# Patient Record
Sex: FEMALE | Race: WHITE | NOT HISPANIC OR LATINO | Employment: OTHER | URBAN - METROPOLITAN AREA
[De-identification: names, ages, dates, MRNs, and addresses within clinical notes are randomized per-mention and may not be internally consistent; named-entity substitution may affect disease eponyms.]

---

## 2019-07-18 ENCOUNTER — THERAPY VISIT (OUTPATIENT)
Dept: PHYSICAL THERAPY | Facility: CLINIC | Age: 64
End: 2019-07-18
Payer: COMMERCIAL

## 2019-07-18 DIAGNOSIS — M25.512 SHOULDER PAIN, LEFT: ICD-10-CM

## 2019-07-18 PROCEDURE — 97112 NEUROMUSCULAR REEDUCATION: CPT | Mod: GP | Performed by: PHYSICAL THERAPIST

## 2019-07-18 PROCEDURE — 97161 PT EVAL LOW COMPLEX 20 MIN: CPT | Mod: GP | Performed by: PHYSICAL THERAPIST

## 2019-07-18 NOTE — PROGRESS NOTES
"[facility] Initial Evaluation  Subjective:  The history is provided by the patient.   Ethel Orozco being seen for Left shoulder pain.   Date of Onset: several months ago. Problem occurred: \"gradual pain in shoulder that got worse, with movement and sleeping\"  and reported as 3/10 on pain scale. General health as reported by patient is excellent. Pertinent medical history includes:  Cancer, concussions/dizziness, implanted device, numbness/tingling, osteoarthritis, osteoporosis, overweight and sleep disorder/apnea.  Medical allergies: latex.  Surgeries include:  Cancer surgery. Other surgery history details: overian cancer surgery history.  Current medications:  Bone density.   Primary job tasks include:  Computer work, lifting/carrying and prolonged sitting.  Pain is described as aching and is constant.  Since onset symptoms are gradually worsening.      Patient is Billing/accounts payable - part time. Restrictions include:  Working in normal job without restrictions.    Barriers include:  None as reported by patient.  Red flags:  Pain at rest/night and cold/hot extremity (pt cleared from these red flags being significant in role of shoulder pathology).  Type of problem:  Left shoulder   Condition occurred with:  Unknown cause. This is a chronic condition   Problem details: Patient has had pain for a couple of months now. She has been screened out for her cardiac PVC's, and this is not the cause of her shoulder pain. .   Patient reports pain:  Scapular area, anterior and lateral. Radiates to:  Upper arm. Associated symptoms:  Dislocating/subluxing, loss of motion/stiffness and loss of strength. Symptoms are exacerbated by certain positions, lying on extremity and using arm behind back (lying on back sometimes is more painful) and relieved by NSAID's.                      Objective:  System                   Shoulder Evaluation:  ROM:  AROM:    Flexion:  Left:  WNL    Right:  WNL    Abduction:  " Left: WNL   Right:  WNL    Internal Rotation:  Left:  T7    Right:  T4  External Rotation:  Left:  C5    Right:  T1                  Pain: past 90 deg in flx, abd    Strength:    Flexion: Left:4/5    Pain: ++    Right: 5-/5     Pain:     Abduction:  Left: 4/5   Pain:++    Right: 5-/5     Pain:    Internal Rotation:  Left:4+/5      Pain:+    Right: 5/5     Pain:  External Rotation:   Left:3+/5      Pain:+++   Right:5-/5     Pain:        Elbow Flexion:  Left:5/5     Pain:    Right:5/5     Pain:      Special Tests:    Left shoulder positive for the following special tests:  Impingement                                         General     ROS    Assessment/Plan:    Patient is a 64 year old female with left side shoulder complaints.    Patient has the following significant findings with corresponding treatment plan.                Diagnosis 1:  L shoulder pain, consistent with impingement and some RC pain  Pain -  manual therapy, self management and home program  Decreased ROM/flexibility - manual therapy, therapeutic exercise, therapeutic activity and home program  Decreased strength - therapeutic exercise, therapeutic activities and home program  Impaired muscle performance - neuro re-education and home program  Decreased function - therapeutic activities and home program  Impaired posture - neuro re-education and therapeutic activities    Therapy Evaluation Codes:     Cumulative Therapy Evaluation is: Low complexity.    Previous and current functional limitations:  (See Goal Flow Sheet for this information)    Short term and Long term goals: (See Goal Flow Sheet for this information)     Communication ability:  Patient appears to be able to clearly communicate and understand verbal and written communication and follow directions correctly.  Treatment Explanation - The following has been discussed with the patient:   RX ordered/plan of care  Anticipated outcomes  Possible risks and side effects  This patient would  benefit from PT intervention to resume normal activities.   Rehab potential is good.    Frequency:  1 X week, once daily  Duration:  for 6 weeks  Discharge Plan:  Achieve all LTG.  Independent in home treatment program.  Reach maximal therapeutic benefit.    Please refer to the daily flowsheet for treatment today, total treatment time and time spent performing 1:1 timed codes.

## 2019-07-25 ENCOUNTER — THERAPY VISIT (OUTPATIENT)
Dept: PHYSICAL THERAPY | Facility: CLINIC | Age: 64
End: 2019-07-25
Payer: COMMERCIAL

## 2019-07-25 DIAGNOSIS — M25.512 SHOULDER PAIN, LEFT: ICD-10-CM

## 2019-07-25 PROCEDURE — 97112 NEUROMUSCULAR REEDUCATION: CPT | Mod: GP | Performed by: PHYSICAL THERAPIST

## 2019-07-25 PROCEDURE — 97110 THERAPEUTIC EXERCISES: CPT | Mod: GP | Performed by: PHYSICAL THERAPIST

## 2019-08-01 ENCOUNTER — THERAPY VISIT (OUTPATIENT)
Dept: PHYSICAL THERAPY | Facility: CLINIC | Age: 64
End: 2019-08-01
Payer: COMMERCIAL

## 2019-08-01 DIAGNOSIS — M25.512 SHOULDER PAIN, LEFT: ICD-10-CM

## 2019-08-01 PROCEDURE — 97110 THERAPEUTIC EXERCISES: CPT | Mod: GP | Performed by: PHYSICAL THERAPIST

## 2019-08-01 PROCEDURE — 97140 MANUAL THERAPY 1/> REGIONS: CPT | Mod: GP | Performed by: PHYSICAL THERAPIST

## 2019-08-01 PROCEDURE — 97112 NEUROMUSCULAR REEDUCATION: CPT | Mod: GP | Performed by: PHYSICAL THERAPIST

## 2019-08-15 ENCOUNTER — THERAPY VISIT (OUTPATIENT)
Dept: PHYSICAL THERAPY | Facility: CLINIC | Age: 64
End: 2019-08-15
Payer: COMMERCIAL

## 2019-08-15 DIAGNOSIS — M25.512 ACUTE PAIN OF LEFT SHOULDER: ICD-10-CM

## 2019-08-15 PROCEDURE — 97140 MANUAL THERAPY 1/> REGIONS: CPT | Mod: GP | Performed by: PHYSICAL THERAPY ASSISTANT

## 2019-08-15 PROCEDURE — 97110 THERAPEUTIC EXERCISES: CPT | Mod: GP | Performed by: PHYSICAL THERAPY ASSISTANT

## 2019-08-15 PROCEDURE — 97112 NEUROMUSCULAR REEDUCATION: CPT | Mod: GP | Performed by: PHYSICAL THERAPY ASSISTANT

## 2019-08-22 ENCOUNTER — THERAPY VISIT (OUTPATIENT)
Dept: PHYSICAL THERAPY | Facility: CLINIC | Age: 64
End: 2019-08-22
Payer: COMMERCIAL

## 2019-08-22 DIAGNOSIS — M25.512 ACUTE PAIN OF LEFT SHOULDER: ICD-10-CM

## 2019-08-22 PROCEDURE — 97112 NEUROMUSCULAR REEDUCATION: CPT | Mod: GP | Performed by: PHYSICAL THERAPIST

## 2019-08-22 PROCEDURE — 97110 THERAPEUTIC EXERCISES: CPT | Mod: GP | Performed by: PHYSICAL THERAPIST

## 2019-08-22 PROCEDURE — 97140 MANUAL THERAPY 1/> REGIONS: CPT | Mod: GP | Performed by: PHYSICAL THERAPIST

## 2019-08-29 ENCOUNTER — THERAPY VISIT (OUTPATIENT)
Dept: PHYSICAL THERAPY | Facility: CLINIC | Age: 64
End: 2019-08-29
Payer: COMMERCIAL

## 2019-08-29 DIAGNOSIS — M25.512 ACUTE PAIN OF LEFT SHOULDER: ICD-10-CM

## 2019-08-29 PROCEDURE — 97110 THERAPEUTIC EXERCISES: CPT | Mod: GP | Performed by: PHYSICAL THERAPIST

## 2019-08-29 PROCEDURE — 97112 NEUROMUSCULAR REEDUCATION: CPT | Mod: GP | Performed by: PHYSICAL THERAPIST

## 2019-08-29 NOTE — PROGRESS NOTES
PROGRESS REPORT    Progress reporting period is from 7/18/19 to 8/29/19.       SUBJECTIVE     Ethel was very sore after last session, but had improved ROM and pain report after. She is able to complete dressing without pain, but uses other arm to assist. Feels that this is about the worst activity for her, and she is about 90% maximal function otherwise. She has a fair amount of questions about her HEP.     Current Pain level: 1/10.     Initial Pain level: 3/10.   Changes in function:  Yes (See Goal flowsheet attached for changes in current functional level)  Adverse reaction to treatment or activity: None    OBJECTIVE  Changes noted in objective findings:  Yes,   Objective: requires cueing for about 75% of HEP exercises;  Progression to GTB for rowing and pull downs today; improved ROM to near WNL without pain, strength deficits noted through fatigue with exercises today    ASSESSMENT/PLAN  Updated problem list and treatment plan:  Diagnosis 1:  L shoulder pain, consistent with impingement and some RC pain  Pain -  manual therapy, self management and home program  Decreased ROM/flexibility - manual therapy, therapeutic exercise, therapeutic activity and home program  Decreased strength - therapeutic exercise, therapeutic activities and home program  Impaired muscle performance - neuro re-education and home program  Decreased function - therapeutic activities and home program  Impaired posture - neuro re-education and therapeutic activities     STG/LTGs have been met or progress has been made towards goals:  Yes (See Goal flow sheet completed today.)  Assessment of Progress: The patient's condition is improving.  Self Management Plans:  Patient has been instructed in a home treatment program.  I have re-evaluated this patient and find that the nature, scope, duration and intensity of the therapy is appropriate for the medical condition of the patient.  Ethel continues to require the following intervention to meet  STG and LTG's:  PT    Recommendations:  This patient would benefit from continued therapy.     Frequency:  1 X week, once daily  Duration:  for 2 weeks        Please refer to the daily flowsheet for treatment today, total treatment time and time spent performing 1:1 timed codes.

## 2019-09-05 ENCOUNTER — THERAPY VISIT (OUTPATIENT)
Dept: PHYSICAL THERAPY | Facility: CLINIC | Age: 64
End: 2019-09-05
Payer: COMMERCIAL

## 2019-09-05 DIAGNOSIS — M25.512 ACUTE PAIN OF LEFT SHOULDER: ICD-10-CM

## 2019-09-05 PROCEDURE — 97110 THERAPEUTIC EXERCISES: CPT | Mod: GP | Performed by: PHYSICAL THERAPIST

## 2019-09-05 PROCEDURE — 97112 NEUROMUSCULAR REEDUCATION: CPT | Mod: GP | Performed by: PHYSICAL THERAPIST

## 2019-09-11 ENCOUNTER — THERAPY VISIT (OUTPATIENT)
Dept: PHYSICAL THERAPY | Facility: CLINIC | Age: 64
End: 2019-09-11
Payer: COMMERCIAL

## 2019-09-11 DIAGNOSIS — M25.512 ACUTE PAIN OF LEFT SHOULDER: ICD-10-CM

## 2019-09-11 PROCEDURE — 97112 NEUROMUSCULAR REEDUCATION: CPT | Mod: GP | Performed by: PHYSICAL THERAPIST

## 2019-09-11 PROCEDURE — 97110 THERAPEUTIC EXERCISES: CPT | Mod: GP | Performed by: PHYSICAL THERAPIST

## 2019-09-11 NOTE — PROGRESS NOTES
DISCHARGE REPORT    Progress reporting period is from 8/29/19 to 9/11/19.       SUBJECTIVE  Subjective changes noted by patient: improved independence through HEP, less pain  Subjective: Ethel is pretty tight on the L shoulder and neck area, likely from doing too much around the house and the HEP.    Current Pain level: 1/10.      Initial Pain level: 3/10.   Changes in function:  Yes (See Goal flowsheet attached for changes in current functional level)  Adverse reaction to treatment or activity: None    OBJECTIVE  Changes noted in objective findings:  Yes, see below    Objective: full ROM, strength 5/5 throughout; improved memory and understanding of all HEP activities to point of appropriate discharge and performance     ASSESSMENT/PLAN  Updated problem list and treatment plan: Diagnosis 1:  L shoulder pain  Pain -  home program  Decreased ROM/flexibility - home program  Decreased strength - home program  Impaired posture - home program  STG/LTGs have been met or progress has been made towards goals:  Yes (See Goal flow sheet completed today.)  Assessment of Progress: The patient's condition is improving.  Self Management Plans:  Patient is independent in a home treatment program.  I have re-evaluated this patient and find that the nature, scope, duration and intensity of the therapy is appropriate for the medical condition of the patient.  Ethel continues to require the following intervention to meet STG and LTG's:  PT intervention is no longer required to meet STG/LTG.    Recommendations:  This patient is ready to be discharged from therapy and continue their home treatment program.    Please refer to the daily flowsheet for treatment today, total treatment time and time spent performing 1:1 timed codes.

## 2021-05-24 ENCOUNTER — RECORDS - HEALTHEAST (OUTPATIENT)
Dept: ADMINISTRATIVE | Facility: CLINIC | Age: 66
End: 2021-05-24

## 2021-05-26 ENCOUNTER — RECORDS - HEALTHEAST (OUTPATIENT)
Dept: ADMINISTRATIVE | Facility: CLINIC | Age: 66
End: 2021-05-26

## 2021-05-27 ENCOUNTER — RECORDS - HEALTHEAST (OUTPATIENT)
Dept: ADMINISTRATIVE | Facility: CLINIC | Age: 66
End: 2021-05-27

## 2021-05-29 ENCOUNTER — RECORDS - HEALTHEAST (OUTPATIENT)
Dept: ADMINISTRATIVE | Facility: CLINIC | Age: 66
End: 2021-05-29

## 2021-05-30 ENCOUNTER — RECORDS - HEALTHEAST (OUTPATIENT)
Dept: ADMINISTRATIVE | Facility: CLINIC | Age: 66
End: 2021-05-30

## 2021-08-05 ENCOUNTER — THERAPY VISIT (OUTPATIENT)
Dept: PHYSICAL THERAPY | Facility: CLINIC | Age: 66
End: 2021-08-05
Payer: COMMERCIAL

## 2021-08-05 DIAGNOSIS — M79.671 RIGHT FOOT PAIN: ICD-10-CM

## 2021-08-05 PROCEDURE — 97161 PT EVAL LOW COMPLEX 20 MIN: CPT | Mod: GP | Performed by: PHYSICAL THERAPIST

## 2021-08-05 PROCEDURE — 97110 THERAPEUTIC EXERCISES: CPT | Mod: GP | Performed by: PHYSICAL THERAPIST

## 2021-08-05 NOTE — PROGRESS NOTES
Physical Therapy Initial Evaluation  Subjective:  The history is provided by the patient. No  was used.   Patient Health History  Ethel Orozco being seen for Right Achilles Pain.     Problem began: 11/5/2020.   Problem occurred: Not sure   Pain is reported as 2/10 on pain scale.  General health as reported by patient is excellent.  Health conditions: headaches, cancer, osteoporosis, sleep disorder, cold extremity.     Medical allergies: latex.       Current medications: bone density, headache medications.    Current occupation is very part time [employer].   Primary job tasks include:  Prolonged sitting and prolonged standing.                  Therapist Generated HPI Evaluation         Type of problem:  Right ankle.    This is a recurrent condition.  Condition occurred with:  Insidious onset.  Where condition occurred: for unknown reasons.  Site of Pain: posterior right Heel.  Pain quality: throbbing. and is intermittent.  Pain radiates to:  No radiation. Pain is worse in the P.M..  Since onset symptoms are unchanged.  Symptoms are exacerbated by activity and certain positions  and relieved by rest.  Special tests included:  X-ray (achilles tendinopathy seen on X-ray).    Barriers include:  None as reported by patient.                        Objective:  Standing Alignment:                Ankle/Foot:  Pes planus R and pes planus L              Ankle/Foot Evaluation  ROM:    AROM:    Dorsiflexion:  Left:   12  Right:   10  Plantarflexion:  Left:  70    Right:  70  Inversion:  Left:  45     Right:  40  Eversion:  45     Right:  30      PROM:          Eversion: Left:      Right:  40      Pain: no pain with B ankle AROM.    Strength:    Dorsiflexion:  Left: 5/5   Strong/pain free    Pain:   Right: 5/5   Strong/pain free  Pain:  Plantarflexion: Left: 4/5   Strong/pain free  Pain:   Right: 2/5  Weak/pain free  Pain:  Inversion:Left: 5/5  Pain:     Right: 4-/5  Pain:  Eversion:Left: 5/5  Strong/pain  free  Pain:  Right: 5/5  Strong/pain free  Pain:                          MOBILITY TESTING:     Tib-Fib Distal Left: normal    Tib-Fib Distal Right: normal  Talocrural Left: normal    Talocrural Right: normal  Subtalar Left: normal    Subtalar Right: hypomobile  Midtarsal Left: normal    Midtarsal Right: normal  First Ray Left: normal    First Ray Right: normal                                                    General     ROS    Assessment/Plan:    Patient is a 66 year old female with right side ankle complaints.    Patient has the following significant findings with corresponding treatment plan.                Diagnosis 1:  Right Foot Pain  Pain -  manual therapy, self management, education and home program  Decreased ROM/flexibility - manual therapy and therapeutic exercise  Decreased strength - therapeutic exercise and therapeutic activities  Impaired gait - gait training  Decreased function - therapeutic activities  Impaired posture - neuro re-education    Therapy Evaluation Codes:   1) History comprised of:   Personal factors that impact the plan of care:      None.    Comorbidity factors that impact the plan of care are:      none.     Medications impacting care: None.  2) Examination of Body Systems comprised of:   Body structures and functions that impact the plan of care:      right foot pain.   Activity limitations that impact the plan of care are:      standing, walking, stairs.  3) Clinical presentation characteristics are:   Stable/Uncomplicated.  4) Decision-Making    Low complexity using standardized patient assessment instrument and/or measureable assessment of functional outcome.  Cumulative Therapy Evaluation is: Low complexity.    Previous and current functional limitations:  (See Goal Flow Sheet for this information)    Short term and Long term goals: (See Goal Flow Sheet for this information)     Communication ability:  Patient appears to be able to clearly communicate and understand verbal and  written communication and follow directions correctly.  Treatment Explanation - The following has been discussed with the patient:   RX ordered/plan of care  Anticipated outcomes  Possible risks and side effects  This patient would benefit from PT intervention to resume normal activities.   Rehab potential is excellent.    Frequency:  1 X week, once daily  Duration:  for 8 weeks  Discharge Plan:  Achieve all LTG.  Independent in home treatment program.  Reach maximal therapeutic benefit.    Please refer to the daily flowsheet for treatment today, total treatment time and time spent performing 1:1 timed codes.

## 2021-08-05 NOTE — LETTER
[facility] [place]  [address]  [address]  [place] [address]  [phone]    2021    Re: Ethel Orozco   :   1955  MRN:  [id number]   REFERRING PHYSICIAN:   Cristhian DOTSON [facility] [place]    Date of Initial Evaluation:  2021   Visits:  Rxs Used: 1  Reason for Referral:  Right foot pain    EVALUATION SUMMARY    Physical Therapy Initial Evaluation  Subjective:  The history is provided by the patient. No  was used.   Patient Health History  Ethel Orozco being seen for Right Achilles Pain.   Problem began: 2020.   Problem occurred: Not sure   Pain is reported as 2/10 on pain scale.  General health as reported by patient is excellent.  Health conditions: headaches, cancer, osteoporosis, sleep disorder, cold extremity.   Medical allergies: latex.   Current medications: bone density, headache medications.    Current occupation is very part time [employer].   Primary job tasks include:  Prolonged sitting and prolonged standing.                Therapist Generated HPI Evaluation   Type of problem:  Right ankle.  This is a recurrent condition.  Condition occurred with:  Insidious onset.  Where condition occurred: for unknown reasons.  Site of Pain: posterior right Heel.  Pain quality: throbbing. and is intermittent.  Pain radiates to:  No radiation. Pain is worse in the P.M..  Since onset symptoms are unchanged.  Symptoms are exacerbated by activity and certain positions  and relieved by rest.  Special tests included:  X-ray (achilles tendinopathy seen on X-ray).  Barriers include:  None as reported by patient.                  Objective:  Standing Alignment:    Ankle/Foot:  Pes planus R and pes planus L      Ethel Orozco   :   1955    Ankle/Foot Evaluation  ROM:    AROM:    Dorsiflexion:  Left:   12  Right:   10  Plantarflexion:  Left:  70    Right:  70  Inversion:  Left:  45      Right:  40  Eversion:  45     Right:  30    PROM:    Eversion: Left:      Right:  40  Pain: no pain with B ankle AROM.    Strength:    Dorsiflexion:  Left: 5/5   Strong/pain free    Pain:   Right: 5/5   Strong/pain free  Pain:  Plantarflexion: Left: 4/5   Strong/pain free  Pain:   Right: 2/5  Weak/pain free  Pain:  Inversion:Left: 5/5  Pain:     Right: 4-/5  Pain:  Eversion:Left: 5/5  Strong/pain free  Pain:  Right: 5/5  Strong/pain free  Pain:    MOBILITY TESTING:   Tib-Fib Distal Left: normal    Tib-Fib Distal Right: normal  Talocrural Left: normal    Talocrural Right: normal  Subtalar Left: normal    Subtalar Right: hypomobile  Midtarsal Left: normal    Midtarsal Right: normal  First Ray Left: normal    First Ray Right: normal    Assessment/Plan:    Patient is a 66 year old female with right side ankle complaints.    Patient has the following significant findings with corresponding treatment plan.                Diagnosis 1:  Right Foot Pain  Pain -  manual therapy, self management, education and home program  Decreased ROM/flexibility - manual therapy and therapeutic exercise  Decreased strength - therapeutic exercise and therapeutic activities  Impaired gait - gait training  Decreased function - therapeutic activities  Impaired posture - neuro re-education    Therapy Evaluation Codes:   1) History comprised of:   Personal factors that impact the plan of care:      None.    Comorbidity factors that impact the plan of care are:      none.     Medications impacting care: None.  2) Examination of Body Systems comprised of:   Body structures and functions that impact the plan of care:      right foot pain.   Activity limitations that impact the plan of care are:      standing, walking, stairs.  3) Clinical presentation characteristics are:   Stable/Uncomplicated.  Ethel Orozco   :   1955    4) Decision-Making    Low complexity using standardized patient assessment instrument and/or measureable assessment of  functional outcome.  Cumulative Therapy Evaluation is: Low complexity.  Previous and current functional limitations:  (See Goal Flow Sheet for this information)    Short term and Long term goals: (See Goal Flow Sheet for this information)   Communication ability:  Patient appears to be able to clearly communicate and understand verbal and written communication and follow directions correctly.  Treatment Explanation - The following has been discussed with the patient:   RX ordered/plan of care  Anticipated outcomes  Possible risks and side effects  This patient would benefit from PT intervention to resume normal activities.   Rehab potential is excellent.    Frequency:  1 X week, once daily  Duration:  for 8 weeks  Discharge Plan:  Achieve all LTG.  Independent in home treatment program.  Reach maximal therapeutic benefit.    Thank you for your referral.    INQUIRIES  Therapist: Silvia Awan, PT, DPT  [address] [address]  Phone: [phone]  Fax: [phone]

## 2021-08-16 ENCOUNTER — THERAPY VISIT (OUTPATIENT)
Dept: PHYSICAL THERAPY | Facility: CLINIC | Age: 66
End: 2021-08-16
Payer: COMMERCIAL

## 2021-08-16 DIAGNOSIS — M79.671 RIGHT FOOT PAIN: ICD-10-CM

## 2021-08-16 PROCEDURE — 97110 THERAPEUTIC EXERCISES: CPT | Mod: GP | Performed by: PHYSICAL THERAPIST

## 2021-08-31 ENCOUNTER — THERAPY VISIT (OUTPATIENT)
Dept: PHYSICAL THERAPY | Facility: CLINIC | Age: 66
End: 2021-08-31
Payer: COMMERCIAL

## 2021-08-31 DIAGNOSIS — M79.671 RIGHT FOOT PAIN: ICD-10-CM

## 2021-08-31 PROCEDURE — 97110 THERAPEUTIC EXERCISES: CPT | Mod: GP | Performed by: PHYSICAL THERAPIST

## 2021-09-27 ENCOUNTER — THERAPY VISIT (OUTPATIENT)
Dept: PHYSICAL THERAPY | Facility: CLINIC | Age: 66
End: 2021-09-27
Payer: COMMERCIAL

## 2021-09-27 DIAGNOSIS — M79.671 RIGHT FOOT PAIN: ICD-10-CM

## 2021-09-27 PROCEDURE — 97110 THERAPEUTIC EXERCISES: CPT | Mod: GP | Performed by: PHYSICAL THERAPIST

## 2021-09-27 NOTE — PROGRESS NOTES
Assessment/Plan:    DISCHARGE REPORT    Progress reporting period is from 8/5/2021 to 9/27/2021.       SUBJECTIVE  Subjective changes noted by patient:   Subjective: pt reports minimal pain in the base of the heel.  Overall, 90-95% better since PT. very happy with her progress.     Initial Pain level: 0/10.   Changes in function:  Yes (See Goal flowsheet attached for changes in current functional level)  Adverse reaction to treatment or activity: None    OBJECTIVE  Changes noted in objective findings:  Yes,   Objective: Ankle Strength: full strength all directions B ankles. full ankle AROM with no pain.     ASSESSMENT/PLAN  Updated problem list and treatment plan:   STG/LTGs have been met or progress has been made towards goals:  Yes (See Goal flow sheet completed today.)  Assessment of Progress: The patient has met all of their long term goals.  Self Management Plans:  Patient is independent in a home treatment program.  I have re-evaluated this patient and find that the nature, scope, duration and intensity of the therapy is appropriate for the medical condition of the patient.  Ethel continues to require the following intervention to meet STG and LTG's:  PT intervention is no longer required to meet STG/LTG.    Recommendations:  This patient is ready to be discharged from therapy and continue their home treatment program.    Please refer to the daily flowsheet for treatment today, total treatment time and time spent performing 1:1 timed codes.

## 2021-10-22 ENCOUNTER — TELEPHONE (OUTPATIENT)
Dept: OTHER | Facility: CLINIC | Age: 66
End: 2021-10-22

## 2021-10-22 NOTE — TELEPHONE ENCOUNTER
Saw Dr. Betancourt back in 2014.  Continues to have lymphedema and is wondering has compression hose and a machine that she would like refilled.   Is ok to reestablish with another MD.      Please call

## 2021-10-22 NOTE — TELEPHONE ENCOUNTER
LOV 1/10/14 with Dr. Cortes @ [facility] for Varicose veins of lower extremities with other complications.    Please schedule for new patient with Vascular Medicine at next available.    Tova Hooper RN BSN  [facility]  [phone]

## 2021-11-28 ENCOUNTER — HEALTH MAINTENANCE LETTER (OUTPATIENT)
Age: 66
End: 2021-11-28

## 2022-09-10 ENCOUNTER — HEALTH MAINTENANCE LETTER (OUTPATIENT)
Age: 67
End: 2022-09-10

## 2023-01-21 ENCOUNTER — HEALTH MAINTENANCE LETTER (OUTPATIENT)
Age: 68
End: 2023-01-21

## 2023-12-10 ENCOUNTER — HEALTH MAINTENANCE LETTER (OUTPATIENT)
Age: 68
End: 2023-12-10

## 2024-02-18 ENCOUNTER — HEALTH MAINTENANCE LETTER (OUTPATIENT)
Age: 69
End: 2024-02-18

## 2024-05-29 ENCOUNTER — THERAPY VISIT (OUTPATIENT)
Dept: PHYSICAL THERAPY | Facility: REHABILITATION | Age: 69
End: 2024-05-29
Payer: COMMERCIAL

## 2024-05-29 DIAGNOSIS — I89.0 LYMPHEDEMA: Primary | ICD-10-CM

## 2024-05-29 PROCEDURE — 97161 PT EVAL LOW COMPLEX 20 MIN: CPT | Mod: GP | Performed by: PHYSICAL THERAPIST

## 2024-05-29 PROCEDURE — 97535 SELF CARE MNGMENT TRAINING: CPT | Mod: GP | Performed by: PHYSICAL THERAPIST

## 2024-05-29 NOTE — PROGRESS NOTES
PHYSICAL THERAPY EVALUATION  Type of Visit: Evaluation    See electronic medical record for Abuse and Falls Screening details.    Patient reports she has a hx of cervical cancer in 2000 with surgery and LN removed on both sides. She had chemotherapy x 2 and finished in April 2001. She started to have swelling afterwards and over the years she had been given compression socks and she received a pump in 2014.   She is in need of a new pair of compression socks and needs a referral for this.     Subjective       Presenting condition or subjective complaint:   need new compression stockings and needs a referral for this   Date of onset: 05/29/24 (Date of order)  2014  Relevant medical history:   hx of cervical cancer in 2000 with surgery and chemo therapy and having lymph nodes removed   Dates & types of surgery:  Aug 16, 2000 surgery, chemo therapy until April 2001    Prior diagnostic imaging/testing results:       Prior therapy history for the same diagnosis, illness or injury:        Prior Level of Function  Transfers: Independent  Ambulation: Independent  ADL: Independent  IADL:     Living Environment  Social support:    at home, retired; sons live near by  Type of home:     Stairs to enter the home:         Ramp:     Stairs inside the home:         Help at home:    Equipment owned:   pneumatic lymphedema compression pump     Employment:    works part time, takes care for 18month old grandson 3x/week   Hobbies/Interests:      Patient goals for therapy:   new compression sock order     Pain assessment: Pain denied     Objective       EDEMA EVALUATION  Additional history:  Body part affected by edema:    If cancer related, treatment:    If not cancer related, problems with veins or cause of swelling:    Distance able to walk:    Time able to stand:    Sensation problems in hands/feet:      Edema etiology: Cancer with lymph node dissection, Chemo, Chronic Venous Insufficiency    FUNCTIONAL SCALES  Lower  Extremity Functional Scale (score out of 80). A lower score indicates greater impairment:    Shoulder Pain and Disability Index (score out of 100).  A higher score indicates greater impairment:      Cognitive Status Examination  Orientation: Oriented to person, place and time   Level of Consciousness: Alert  Follows Commands and Answers Questions: 100% of the time  Personal Safety and Judgement: Intact  Memory: Intact    EDEMA  Skin Condition: Intact  Scar: Yes  Abdominal   Capillary Refill: Symmetrical  Radial Pulse:   Dorsal Pedal Pulse:   Stemmer Sign: +  Ulceration: No    GIRTH MEASUREMENTS: Refer to separate girth measurement flowsheet.     VOLUME LE - not taken today      Assessment & Plan   CLINICAL IMPRESSIONS  Medical Diagnosis: Lymphedema    Treatment Diagnosis: Lymphedema   Impression/Assessment: Patient is a 69 year old female with complaints of ongoing lymphedema since her cancer in 2000. She has been managing the lymphedema with compression stockings, water exercise and a pneumatic lymphedema compression pump since 2014. She is in need of new compression stockings and needs orders for this. Provided gave information on where to get compression stockings and reached out to her primary MD at [facility] for compression orders for her and encouraged her to reach out to the primary MD as well. She had seen vascular medicine in 2014 before but has not re-established care with anyone as of yet.   The following significant findings have been identified: Edema. These impairments interfere with their ability to perform  as compared to previous level of function.     Clinical Decision Making (Complexity):  Clinical Presentation: Stable/Uncomplicated  Clinical Presentation Rationale: based on medical and personal factors listed in PT evaluation  Clinical Decision Making (Complexity): Low complexity    PLAN OF CARE  Treatment Interventions:  Interventions: Manual Therapy, Neuromuscular Re-education, Therapeutic  Activity, Therapeutic Exercise, Self-Care/Home Management, Gradient Compression Bandaging    Long Term Goals     PT Goal 1  Goal Identifier: HEP/Self care-home management  Goal Description: Patient will be independent in a HEP and other self care home management techniques such as compression garments, use of a pump and skin care for ongoing symptom management in 90 days  Target Date: 08/27/24      Frequency of Treatment: 1 time every 2-4 weeks as needed  Duration of Treatment: up to 90 days    Recommended Referrals to Other Professionals:  see [facility] for compression stockings when order is in   Education Assessment:   Learner/Method: Patient;Listening;Reading;Demonstration    Risks and benefits of evaluation/treatment have been explained.   Patient/Family/caregiver agrees with Plan of Care.     Evaluation Time:     PT Eval, Low Complexity Minutes (88051): 25   Present: Not applicable     Signing Clinician: NBA Sandoval [facility]                                                                                   OUTPATIENT PHYSICAL THERAPY      PLAN OF TREATMENT FOR OUTPATIENT REHABILITATION   Patient's Last Name, First Name, MEthel Bravo YOB: 1955   Provider's Name   [facility]   Medical Record No.  [id number]     Onset Date: 05/29/24 (Date of order)  Start of Care Date: 05/29/24     Medical Diagnosis:  Lymphedema      PT Treatment Diagnosis:  Lymphedema Plan of Treatment  Frequency/Duration: 1 time every 2-4 weeks as needed/ up to 90 days    Certification date from 05/29/24 to 08/27/24         See note for plan of treatment details and functional goals     Becki Rosales, PT                         I CERTIFY THE NEED FOR THESE SERVICES FURNISHED UNDER        THIS PLAN OF TREATMENT AND WHILE UNDER MY CARE .             Physician Signature                Date    X_____________________________________________________                  Referring Provider:  Liza Lozano [facility]     Initial Assessment  See Epic Evaluation- Start of Care Date: 05/29/24

## 2024-09-15 ENCOUNTER — HEALTH MAINTENANCE LETTER (OUTPATIENT)
Age: 69
End: 2024-09-15